# Patient Record
Sex: FEMALE | URBAN - METROPOLITAN AREA
[De-identification: names, ages, dates, MRNs, and addresses within clinical notes are randomized per-mention and may not be internally consistent; named-entity substitution may affect disease eponyms.]

---

## 2024-07-15 ENCOUNTER — OFFICE VISIT (OUTPATIENT)
Dept: URBAN - METROPOLITAN AREA CLINIC 82 | Facility: CLINIC | Age: 44
End: 2024-07-15
Payer: COMMERCIAL

## 2024-07-15 ENCOUNTER — LAB OUTSIDE AN ENCOUNTER (OUTPATIENT)
Dept: URBAN - METROPOLITAN AREA CLINIC 82 | Facility: CLINIC | Age: 44
End: 2024-07-15

## 2024-07-15 ENCOUNTER — DASHBOARD ENCOUNTERS (OUTPATIENT)
Age: 44
End: 2024-07-15

## 2024-07-15 VITALS
TEMPERATURE: 97.5 F | SYSTOLIC BLOOD PRESSURE: 125 MMHG | BODY MASS INDEX: 31.54 KG/M2 | DIASTOLIC BLOOD PRESSURE: 85 MMHG | HEIGHT: 63 IN | WEIGHT: 178 LBS | HEART RATE: 88 BPM

## 2024-07-15 DIAGNOSIS — K21.9 GASTROESOPHAGEAL REFLUX DISEASE, UNSPECIFIED WHETHER ESOPHAGITIS PRESENT: ICD-10-CM

## 2024-07-15 DIAGNOSIS — K59.09 CHRONIC CONSTIPATION: ICD-10-CM

## 2024-07-15 DIAGNOSIS — R14.0 ABDOMINAL BLOATING: ICD-10-CM

## 2024-07-15 PROBLEM — 235595009: Status: ACTIVE | Noted: 2024-07-15

## 2024-07-15 PROBLEM — 236069009: Status: ACTIVE | Noted: 2024-07-15

## 2024-07-15 PROBLEM — 116289008: Status: ACTIVE | Noted: 2024-07-15

## 2024-07-15 PROCEDURE — 99204 OFFICE O/P NEW MOD 45 MIN: CPT | Performed by: STUDENT IN AN ORGANIZED HEALTH CARE EDUCATION/TRAINING PROGRAM

## 2024-07-15 RX ORDER — POLYETHYLENE GLYCOL-3350 AND ELECTROLYTES WITH FLAVOR PACK 240; 5.84; 2.98; 6.72; 22.72 G/278.26G; G/278.26G; G/278.26G; G/278.26G; G/278.26G
4000 ML POWDER, FOR SOLUTION ORAL 1
Qty: 1 | Refills: 0 | OUTPATIENT
Start: 2024-07-15 | End: 2024-07-16

## 2024-07-15 NOTE — HPI-TODAY'S VISIT:
42 yo female presents today for c/o acid reflux, bloating, constipation and diverticulosis. Pt has chronic heartburns, feels like its burns from her gut to her throat, would last 30mins after meals. Would sometimes wake her up at night. Admits regurgitation depending on what she eats. Taking pepcid AC, minimal relief. No NV. Currently on PPI 40mg, sometimes would work, sometimes it wouldnt. +bloating always there, worse after her . Doesnt matter what she eats. Also chronic constipation for years, BM: irregular, BSFS type 1. No rectal bleeds. Attempted colace, fiber, miralax, minimal relief. Saw another GI doc, Dr. Thompson, nothing was completed, decided to come here instead. CT A/P on 2024 noted hiatal hernia and sigmoid diverticulosis.

## 2024-09-06 ENCOUNTER — CLAIMS CREATED FROM THE CLAIM WINDOW (OUTPATIENT)
Dept: URBAN - METROPOLITAN AREA SURGERY CENTER 13 | Facility: SURGERY CENTER | Age: 44
End: 2024-09-06
Payer: COMMERCIAL

## 2024-09-06 DIAGNOSIS — K64.8 OTHER HEMORRHOIDS: ICD-10-CM

## 2024-09-06 DIAGNOSIS — K29.60 ADENOPAPILLOMATOSIS GASTRICA: ICD-10-CM

## 2024-09-06 DIAGNOSIS — K57.30 DIVERTICULOSIS OF LARGE INTESTINE WITHOUT PERFORATION OR ABSCESS WITHOUT BLEEDING: ICD-10-CM

## 2024-09-06 DIAGNOSIS — K59.09 CHANGE IN BOWEL MOVEMENTS INTERMITTENT CONSTIPATION. URGENCY IN THE MORNING.: ICD-10-CM

## 2024-09-06 DIAGNOSIS — K21.9 GASTRO-ESOPHAGEAL REFLUX DISEASE WITHOUT ESOPHAGITIS: ICD-10-CM

## 2024-09-06 DIAGNOSIS — K29.30 CHRONIC SUPERFICIAL GASTRITIS WITHOUT BLEEDING: ICD-10-CM

## 2024-09-06 PROCEDURE — 43239 EGD BIOPSY SINGLE/MULTIPLE: CPT | Performed by: INTERNAL MEDICINE

## 2024-09-06 PROCEDURE — 45378 DIAGNOSTIC COLONOSCOPY: CPT | Performed by: INTERNAL MEDICINE

## 2024-09-06 PROCEDURE — 00813 ANES UPR LWR GI NDSC PX: CPT | Performed by: ANESTHESIOLOGIST ASSISTANT

## 2024-09-06 PROCEDURE — 00813 ANES UPR LWR GI NDSC PX: CPT | Performed by: ANESTHESIOLOGY

## 2024-09-10 ENCOUNTER — TELEPHONE ENCOUNTER (OUTPATIENT)
Dept: URBAN - METROPOLITAN AREA CLINIC 82 | Facility: CLINIC | Age: 44
End: 2024-09-10

## 2024-09-10 RX ORDER — ONDANSETRON HYDROCHLORIDE 4 MG/1
1 TABLET TABLET, FILM COATED ORAL
Qty: 10 | OUTPATIENT
Start: 2024-09-12

## 2024-09-10 RX ORDER — LANSOPRAZOLE 30 MG/1
1 CAPSULE BEFORE A MEAL CAPSULE, DELAYED RELEASE ORAL
Qty: 28 | Refills: 0 | OUTPATIENT
Start: 2024-09-12

## 2024-09-10 RX ORDER — AMOXICILLIN 500 MG/1
2 CAPSULES CAPSULE ORAL
Qty: 56 CAPSULE | OUTPATIENT
Start: 2024-09-12 | End: 2024-09-26

## 2024-09-10 RX ORDER — CLARITHROMYCIN 500 MG/1
1 TABLET TABLET, FILM COATED ORAL
Qty: 28 TABLET | Refills: 0 | OUTPATIENT
Start: 2024-09-12 | End: 2024-09-26

## 2024-09-23 ENCOUNTER — TELEPHONE ENCOUNTER (OUTPATIENT)
Dept: URBAN - METROPOLITAN AREA CLINIC 82 | Facility: CLINIC | Age: 44
End: 2024-09-23

## 2024-09-23 RX ORDER — POLYETHYLENE GLYCOL 3350, SODIUM SULFATE ANHYDROUS, SODIUM BICARBONATE, SODIUM CHLORIDE, POTASSIUM CHLORIDE 236; 22.74; 6.74; 5.86; 2.97 G/4L; G/4L; G/4L; G/4L; G/4L
4000 MILLITERS POWDER, FOR SOLUTION ORAL ONCE
Qty: 4000 MILLILITER | Refills: 0 | OUTPATIENT
Start: 2024-09-25 | End: 2024-09-26

## 2024-09-25 ENCOUNTER — LAB OUTSIDE AN ENCOUNTER (OUTPATIENT)
Dept: URBAN - METROPOLITAN AREA CLINIC 82 | Facility: CLINIC | Age: 44
End: 2024-09-25

## 2024-10-07 ENCOUNTER — OFFICE VISIT (OUTPATIENT)
Dept: URBAN - METROPOLITAN AREA CLINIC 82 | Facility: CLINIC | Age: 44
End: 2024-10-07
Payer: COMMERCIAL

## 2024-10-07 VITALS
WEIGHT: 176.6 LBS | DIASTOLIC BLOOD PRESSURE: 91 MMHG | BODY MASS INDEX: 31.29 KG/M2 | SYSTOLIC BLOOD PRESSURE: 136 MMHG | HEIGHT: 63 IN | TEMPERATURE: 97.2 F | HEART RATE: 75 BPM

## 2024-10-07 DIAGNOSIS — K29.70 GASTRITIS WITHOUT BLEEDING, UNSPECIFIED CHRONICITY, UNSPECIFIED GASTRITIS TYPE: ICD-10-CM

## 2024-10-07 DIAGNOSIS — A04.8 H. PYLORI INFECTION: ICD-10-CM

## 2024-10-07 DIAGNOSIS — K44.9 HIATAL HERNIA: ICD-10-CM

## 2024-10-07 DIAGNOSIS — K58.1 IRRITABLE BOWEL SYNDROME WITH CONSTIPATION: ICD-10-CM

## 2024-10-07 PROBLEM — 440630006: Status: ACTIVE | Noted: 2024-10-07

## 2024-10-07 PROBLEM — 721730009: Status: ACTIVE | Noted: 2024-10-07

## 2024-10-07 PROBLEM — 84089009: Status: ACTIVE | Noted: 2024-10-07

## 2024-10-07 PROBLEM — 4556007: Status: ACTIVE | Noted: 2024-10-07

## 2024-10-07 PROCEDURE — 99214 OFFICE O/P EST MOD 30 MIN: CPT | Performed by: STUDENT IN AN ORGANIZED HEALTH CARE EDUCATION/TRAINING PROGRAM

## 2024-10-07 RX ORDER — ONDANSETRON HYDROCHLORIDE 4 MG/1
1 TABLET TABLET, FILM COATED ORAL
Qty: 10 | Status: ACTIVE | COMMUNITY
Start: 2024-09-12

## 2024-10-07 RX ORDER — LANSOPRAZOLE 30 MG/1
1 CAPSULE BEFORE A MEAL CAPSULE, DELAYED RELEASE ORAL
Qty: 28 | Refills: 0 | Status: ACTIVE | COMMUNITY
Start: 2024-09-12

## 2024-10-07 NOTE — HPI-TODAY'S VISIT:
7/15/24 42 yo female presents today for c/o acid reflux, bloating, constipation and diverticulosis. Pt has chronic heartburns, feels like its burns from her gut to her throat, would last 30mins after meals. Would sometimes wake her up at night. Admits regurgitation depending on what she eats. Taking pepcid AC, minimal relief. No NV. Currently on PPI 40mg, sometimes would work, sometimes it wouldnt. +bloating always there, worse after her . Doesnt matter what she eats. Also chronic constipation for years, BM: irregular, BSFS type 1. No rectal bleeds. Attempted colace, fiber, miralax, minimal relief. Saw another GI doc, Dr. Thompson, nothing was completed, decided to come here instead. CT A/P on 2024 noted hiatal hernia and sigmoid diverticulosis.  10/7/24 Pt is here for F/U. EGD 2024: Small hiatal hernia. Non-erosive gastritis, characterized by erythema. Bx noted h pylori. Was given triple therapy. Colonoscopy 2024: Hemorrhoids found on perianal exam. Diverticulosis in the sigmoid colon and in the ascending colon. The distal rectum and anal verge are normal on retroflexion view. Repeat in 7 yrs. Patient admits compliance to h pylori therapy, completed 2 wks ago but still taking PPI for heartburns. Her main concern is the constipation. She has attempted linzess, no relief. Taking 3 pills of magnesium citrate / docusate daily w/out much relief either. Pendingf XRAY but location is in [place], requesting something closer. Started taking daily probiotics, activa yogurt, has not noticed much improvement. She states that it feels as though her stool sits in her rectum and would not come out.

## 2024-10-10 ENCOUNTER — LAB OUTSIDE AN ENCOUNTER (OUTPATIENT)
Dept: URBAN - METROPOLITAN AREA CLINIC 82 | Facility: CLINIC | Age: 44
End: 2024-10-10

## 2024-10-28 ENCOUNTER — LAB OUTSIDE AN ENCOUNTER (OUTPATIENT)
Dept: URBAN - METROPOLITAN AREA CLINIC 82 | Facility: CLINIC | Age: 44
End: 2024-10-28

## 2024-10-30 LAB — H PYLORI BREATH TEST: POSITIVE

## 2024-11-01 ENCOUNTER — TELEPHONE ENCOUNTER (OUTPATIENT)
Dept: URBAN - METROPOLITAN AREA CLINIC 82 | Facility: CLINIC | Age: 44
End: 2024-11-01

## 2024-11-01 RX ORDER — FLUCONAZOLE 150 MG/1
1 TABLET TABLET ORAL 1
Qty: 1 | Refills: 0 | OUTPATIENT
Start: 2024-11-01 | End: 2024-11-02

## 2024-11-01 RX ORDER — BISMUTH SUBSALICYLATE 262 MG/1
2 TABLETS WITH MEALS AND AT BEDTIME TABLET, CHEWABLE ORAL
Qty: 112 TABLET | Refills: 0 | OUTPATIENT

## 2024-11-01 RX ORDER — OMEPRAZOLE 20 MG/1
1 CAPSULE 30 MINUTES BEFORE MEAL CAPSULE, DELAYED RELEASE ORAL TWICE A DAY
Qty: 28 | Refills: 0 | OUTPATIENT

## 2024-11-01 RX ORDER — TETRACYCLINE HYDROCHLORIDE 500 MG/1
1 CAPSULE ON AN EMPTY STOMACH CAPSULE ORAL
Qty: 56 CAPSULE | Refills: 0 | OUTPATIENT

## 2024-11-01 RX ORDER — METRONIDAZOLE 250 MG/1
1 TABLET TABLET ORAL
Qty: 56 TABLET | Refills: 0 | OUTPATIENT

## 2024-11-18 ENCOUNTER — OFFICE VISIT (OUTPATIENT)
Dept: URBAN - METROPOLITAN AREA CLINIC 82 | Facility: CLINIC | Age: 44
End: 2024-11-18
Payer: COMMERCIAL

## 2024-11-18 ENCOUNTER — LAB OUTSIDE AN ENCOUNTER (OUTPATIENT)
Dept: URBAN - METROPOLITAN AREA CLINIC 82 | Facility: CLINIC | Age: 44
End: 2024-11-18

## 2024-11-18 VITALS
BODY MASS INDEX: 30.37 KG/M2 | HEIGHT: 63 IN | TEMPERATURE: 97.5 F | SYSTOLIC BLOOD PRESSURE: 122 MMHG | HEART RATE: 80 BPM | WEIGHT: 171.4 LBS | DIASTOLIC BLOOD PRESSURE: 85 MMHG

## 2024-11-18 DIAGNOSIS — R11.0 NAUSEA: ICD-10-CM

## 2024-11-18 DIAGNOSIS — K58.1 IRRITABLE BOWEL SYNDROME WITH CONSTIPATION: ICD-10-CM

## 2024-11-18 DIAGNOSIS — R10.33 PERIUMBILICAL PAIN: ICD-10-CM

## 2024-11-18 DIAGNOSIS — A04.8 H. PYLORI INFECTION: ICD-10-CM

## 2024-11-18 PROCEDURE — 99214 OFFICE O/P EST MOD 30 MIN: CPT | Performed by: STUDENT IN AN ORGANIZED HEALTH CARE EDUCATION/TRAINING PROGRAM

## 2024-11-18 RX ORDER — LANSOPRAZOLE 30 MG/1
1 CAPSULE BEFORE A MEAL CAPSULE, DELAYED RELEASE ORAL
Qty: 28 | Refills: 0 | Status: DISCONTINUED | COMMUNITY
Start: 2024-09-12

## 2024-11-18 RX ORDER — TETRACYCLINE HYDROCHLORIDE 500 MG/1
1 CAPSULE ON AN EMPTY STOMACH CAPSULE ORAL
Qty: 56 CAPSULE | Refills: 0 | Status: ACTIVE | COMMUNITY

## 2024-11-18 RX ORDER — BISMUTH SUBSALICYLATE 262 MG/1
2 TABLETS WITH MEALS AND AT BEDTIME TABLET, CHEWABLE ORAL
Qty: 112 TABLET | Refills: 0 | Status: ACTIVE | COMMUNITY

## 2024-11-18 RX ORDER — OMEPRAZOLE 20 MG/1
1 CAPSULE 30 MINUTES BEFORE MEAL CAPSULE, DELAYED RELEASE ORAL TWICE A DAY
Qty: 28 | Refills: 0 | Status: ACTIVE | COMMUNITY

## 2024-11-18 RX ORDER — METRONIDAZOLE 250 MG/1
1 TABLET TABLET ORAL
Qty: 56 TABLET | Refills: 0 | Status: ACTIVE | COMMUNITY

## 2024-11-18 RX ORDER — ONDANSETRON HYDROCHLORIDE 4 MG/1
1 TABLET TABLET, FILM COATED ORAL
Qty: 10 | Status: ACTIVE | COMMUNITY
Start: 2024-09-12

## 2024-11-18 NOTE — HPI-TODAY'S VISIT:
7/15/24 42 yo female presents today for c/o acid reflux, bloating, constipation and diverticulosis. Pt has chronic heartburns, feels like its burns from her gut to her throat, would last 30mins after meals. Would sometimes wake her up at night. Admits regurgitation depending on what she eats. Taking pepcid AC, minimal relief. No NV. Currently on PPI 40mg, sometimes would work, sometimes it wouldnt. +bloating always there, worse after her . Doesnt matter what she eats. Also chronic constipation for years, BM: irregular, BSFS type 1. No rectal bleeds. Attempted colace, fiber, miralax, minimal relief. Saw another GI doc, Dr. Thompson, nothing was completed, decided to come here instead. CT A/P on 2024 noted hiatal hernia and sigmoid diverticulosis.  10/7/24 Pt is here for F/U. EGD 2024: Small hiatal hernia. Non-erosive gastritis, characterized by erythema. Bx noted h pylori. Was given triple therapy. Colonoscopy 2024: Hemorrhoids found on perianal exam. Diverticulosis in the sigmoid colon and in the ascending colon. The distal rectum and anal verge are normal on retroflexion view. Repeat in 7 yrs. Patient admits compliance to h pylori therapy, completed 2 wks ago but still taking PPI for heartburns. Her main concern is the constipation. She has attempted linzess, no relief. Taking 3 pills of magnesium citrate / docusate daily w/out much relief either. Pending XRAY but location is in [place], requesting something closer. Started taking daily probiotics, activa yogurt, has not noticed much improvement. She states that it feels as though her stool sits in her rectum and would not come out.  24 Patient presents today for F/U. H pylori was still pos, was given quad therapy. Patient was somehow not given clear instructions that total of 4 meds need to be taken. Pharmacist had her  bismuth otc, but gave patient no instructions that was sent through the RX file. She has been compliance w/ tetracycline and metronidazole but not PPI and bismuth. Last day of treatment is tmr.  Patient states that ibsrela was not effective for her constipation. BM: daily but also on abx. 5-8x per day, everytime after she eats, fluffy-ball like. No rectal bleeding. But still feels like theres remaining stool in her rectum. Wasnt able to see the pelvic floor therapist, lots of family events going on. She states that now she is having more periumbilical pain. Denies any known pallative/provocative factors. Admits intermittent nausea as well, not sure if its influenced by abx use. She wants to get off taking meds, states that she wants to treat her sx all naturally.

## 2024-12-13 ENCOUNTER — LAB OUTSIDE AN ENCOUNTER (OUTPATIENT)
Dept: URBAN - METROPOLITAN AREA CLINIC 82 | Facility: CLINIC | Age: 44
End: 2024-12-13

## 2024-12-18 ENCOUNTER — OFFICE VISIT (OUTPATIENT)
Dept: URBAN - METROPOLITAN AREA CLINIC 82 | Facility: CLINIC | Age: 44
End: 2024-12-18

## 2024-12-19 ENCOUNTER — TELEPHONE ENCOUNTER (OUTPATIENT)
Dept: URBAN - METROPOLITAN AREA CLINIC 82 | Facility: CLINIC | Age: 44
End: 2024-12-19